# Patient Record
Sex: FEMALE | Race: WHITE
[De-identification: names, ages, dates, MRNs, and addresses within clinical notes are randomized per-mention and may not be internally consistent; named-entity substitution may affect disease eponyms.]

---

## 2017-01-23 ENCOUNTER — HOSPITAL ENCOUNTER (OUTPATIENT)
Dept: HOSPITAL 45 - C.LAB1850 | Age: 62
Discharge: HOME | End: 2017-01-23
Attending: INTERNAL MEDICINE
Payer: COMMERCIAL

## 2017-01-23 DIAGNOSIS — M81.0: Primary | ICD-10-CM

## 2017-01-23 DIAGNOSIS — E61.8: ICD-10-CM

## 2017-01-23 DIAGNOSIS — E55.9: ICD-10-CM

## 2017-01-23 LAB
CALCIUM SERPL-MCNC: 9.2 MG/DL (ref 8.5–10.1)
CALCIUM UR-MCNC: 13.6 MG/DL
COLLECT DURATION TIME UR: 24 HOURS
CREAT SERPL-MCNC: 1 MG/DL (ref 0.6–1.2)

## 2017-01-24 LAB
ALBUMIN SERPL-MCNC: 4.3 G/DL (ref 3.8–4.8)
BETA-2-GLOBULIN: 0.3 G/DL (ref 0.2–0.5)
ELECTROPHORESIS INTERPRET: (no result)
GAMMA GLOB SERPL ELPH-MCNC: 1.2 G/DL (ref 0.8–1.7)
PROT SERPL-MCNC: 7.2 G/DL (ref 6.2–8.3)

## 2017-04-21 ENCOUNTER — HOSPITAL ENCOUNTER (OUTPATIENT)
Dept: HOSPITAL 45 - C.LAB1850 | Age: 62
Discharge: HOME | End: 2017-04-21
Attending: INTERNAL MEDICINE
Payer: COMMERCIAL

## 2017-04-21 DIAGNOSIS — E61.8: ICD-10-CM

## 2017-04-21 DIAGNOSIS — E55.9: ICD-10-CM

## 2017-04-21 DIAGNOSIS — M81.0: Primary | ICD-10-CM

## 2018-02-22 ENCOUNTER — HOSPITAL ENCOUNTER (OUTPATIENT)
Dept: HOSPITAL 45 - C.LAB1850 | Age: 63
Discharge: HOME | End: 2018-02-22
Attending: INTERNAL MEDICINE
Payer: COMMERCIAL

## 2018-02-22 DIAGNOSIS — M81.0: ICD-10-CM

## 2018-02-22 DIAGNOSIS — E78.00: ICD-10-CM

## 2018-02-22 DIAGNOSIS — E55.9: ICD-10-CM

## 2018-02-22 DIAGNOSIS — E61.8: ICD-10-CM

## 2018-02-22 DIAGNOSIS — E03.9: ICD-10-CM

## 2018-02-22 DIAGNOSIS — I10: Primary | ICD-10-CM

## 2018-02-22 DIAGNOSIS — R73.01: ICD-10-CM

## 2018-02-22 LAB
ALBUMIN SERPL-MCNC: 3.8 GM/DL (ref 3.4–5)
ALP SERPL-CCNC: 74 U/L (ref 45–117)
ALT SERPL-CCNC: 31 U/L (ref 12–78)
AST SERPL-CCNC: 15 U/L (ref 15–37)
BASOPHILS # BLD: 0.03 K/UL (ref 0–0.2)
BASOPHILS NFR BLD: 0.5 %
BUN SERPL-MCNC: 22 MG/DL (ref 7–18)
CALCIUM SERPL-MCNC: 9.3 MG/DL (ref 8.5–10.1)
CO2 SERPL-SCNC: 27 MMOL/L (ref 21–32)
CREAT SERPL-MCNC: 1.07 MG/DL (ref 0.6–1.2)
EOS ABS #: 0.11 K/UL (ref 0–0.5)
EOSINOPHIL NFR BLD AUTO: 329 K/UL (ref 130–400)
GLUCOSE SERPL-MCNC: 86 MG/DL (ref 70–99)
HBA1C MFR BLD: 5.6 % (ref 4.5–5.6)
HCT VFR BLD CALC: 43.5 % (ref 37–47)
HGB BLD-MCNC: 14.9 G/DL (ref 12–16)
IG#: 0.01 K/UL (ref 0–0.02)
IMM GRANULOCYTES NFR BLD AUTO: 32.6 %
KETONES UR QL STRIP: 107 MG/DL
LYMPHOCYTES # BLD: 1.89 K/UL (ref 1.2–3.4)
MCH RBC QN AUTO: 30.6 PG (ref 25–34)
MCHC RBC AUTO-ENTMCNC: 34.3 G/DL (ref 32–36)
MCV RBC AUTO: 89.3 FL (ref 80–100)
MONO ABS #: 0.47 K/UL (ref 0.11–0.59)
MONOCYTES NFR BLD: 8.1 %
NEUT ABS #: 3.29 K/UL (ref 1.4–6.5)
NEUTROPHILS # BLD AUTO: 1.9 %
NEUTROPHILS NFR BLD AUTO: 56.7 %
PH UR: 185 MG/DL (ref 0–200)
PMV BLD AUTO: 9.4 FL (ref 7.4–10.4)
POTASSIUM SERPL-SCNC: 4.1 MMOL/L (ref 3.5–5.1)
PROT SERPL-MCNC: 7.8 GM/DL (ref 6.4–8.2)
RED CELL DISTRIBUTION WIDTH CV: 14.1 % (ref 11.5–14.5)
RED CELL DISTRIBUTION WIDTH SD: 46 FL (ref 36.4–46.3)
SODIUM SERPL-SCNC: 136 MMOL/L (ref 136–145)
WBC # BLD AUTO: 5.8 K/UL (ref 4.8–10.8)

## 2018-05-16 ENCOUNTER — HOSPITAL ENCOUNTER (OUTPATIENT)
Dept: HOSPITAL 45 - C.GI | Age: 63
Discharge: HOME | End: 2018-05-16
Attending: INTERNAL MEDICINE
Payer: COMMERCIAL

## 2018-05-16 VITALS
WEIGHT: 175.38 LBS | HEIGHT: 62.01 IN | BODY MASS INDEX: 31.87 KG/M2 | WEIGHT: 175.38 LBS | HEIGHT: 62.01 IN | BODY MASS INDEX: 31.87 KG/M2

## 2018-05-16 VITALS — OXYGEN SATURATION: 98 % | SYSTOLIC BLOOD PRESSURE: 111 MMHG | HEART RATE: 78 BPM | DIASTOLIC BLOOD PRESSURE: 66 MMHG

## 2018-05-16 DIAGNOSIS — Z91.041: ICD-10-CM

## 2018-05-16 DIAGNOSIS — Z12.11: Primary | ICD-10-CM

## 2018-05-16 DIAGNOSIS — K21.9: ICD-10-CM

## 2018-05-16 DIAGNOSIS — Z91.013: ICD-10-CM

## 2018-05-16 DIAGNOSIS — Z90.710: ICD-10-CM

## 2018-05-16 DIAGNOSIS — Z90.12: ICD-10-CM

## 2018-05-16 DIAGNOSIS — Z79.899: ICD-10-CM

## 2018-05-16 DIAGNOSIS — D12.8: ICD-10-CM

## 2018-05-16 DIAGNOSIS — K64.8: ICD-10-CM

## 2018-05-16 DIAGNOSIS — Z85.3: ICD-10-CM

## 2018-05-16 DIAGNOSIS — Z87.891: ICD-10-CM

## 2018-05-16 DIAGNOSIS — E78.5: ICD-10-CM

## 2018-05-16 DIAGNOSIS — I10: ICD-10-CM

## 2018-05-16 NOTE — ANESTHESIOLOGY PROGRESS NOTE
Anesthesia Post Op Note


Date & Time


May 16, 2018 at 11:36





Vital Signs





Vital Signs Past 12 Hours








  Date Time  Temp Pulse Resp B/P (MAP) Pulse Ox O2 Delivery O2 Flow Rate FiO2


 


5/16/18 11:29  74 18 91/65 (74) 96 Room Air  


 


5/16/18 11:14  76 16 97/61 (73) 96 Room Air  


 


5/16/18 10:28 36.4 72 20 139/97 (111) 97 Room Air  











Notes


Mental Status:  alert / awake / arousable, participated in evaluation


Pt Amnestic to Procedure:  Yes


Nausea / Vomiting:  adequately controlled


Pain:  adequately controlled


Airway Patency, RR, SpO2:  stable & adequate


BP & HR:  stable & adequate


Hydration State:  stable & adequate


Anesthetic Complications:  no major complications apparent

## 2018-05-16 NOTE — GI REPORT
Patient Name: Rosa Crowder

Procedure Date: 5/16/2018 10:30 AM

MRN: [id number]

Account Number: [id number]

YOB: 1955

Admit Type: Outpatient

Age: 62

Gender: Female

Attending MD: Stephen Thurman DO

Procedure:            Colonoscopy

Providers:            Stephen Thurman DO

Referring MD:         Richard Brock

Indications:          Screening for colorectal malignant neoplasm

Medicines:            Monitored Anesthesia Care

Complications:        No immediate complications.

Estimated Blood Loss: Estimated blood loss: none.

Procedure:            Pre-Anesthesia Assessment:

                      - Prior to the procedure, a History and Physical was 

                      performed, and patient medications and allergies were 

                      reviewed. The patient's tolerance of previous 

                      anesthesia was also reviewed. The risks and benefits of 

                      the procedure and the sedation options and risks were 

                      discussed with the patient. All questions were 

                      answered, and informed consent was obtained. Prior 

                      Anticoagulants: The patient has taken aspirin, last 

                      dose was 4 days prior to procedure. ASA Grade 

                      Assessment: II - A patient with mild systemic disease. 

                      After reviewing the risks and benefits, the patient was 

                      deemed in satisfactory condition to undergo the 

                      procedure.

                      After I obtained informed consent, the scope was passed 

                      under direct vision. Throughout the procedure, the 

                      patient's blood pressure, pulse, and oxygen saturations 

                      were monitored continuously. The scope was introduced 

                      through the anus and advanced to the terminal ileum. 

                      The colonoscopy was performed without difficulty. The 

                      patient tolerated the procedure well. The quality of 

                      the bowel preparation was good. The terminal ileum, 

                      ileocecal valve, appendiceal orifice, and rectum were 

                      photographed.

Findings:

     The perianal and digital rectal examinations were normal.

     A 6 mm polyp was found in the rectum. The polyp was sessile. The polyp 

     was removed with a hot snare. Resection and retrieval were complete.

     Non-bleeding internal hemorrhoids were found during retroflexion. The 

     hemorrhoids were small.

Impression:           - One 6 mm polyp in the rectum, removed with a hot 

                      snare. Resected and retrieved.

                      - Non-bleeding internal hemorrhoids.

Recommendation:       - Resume previous diet.

                      - Continue present medications.

                      - Repeat colonoscopy for surveillance based on 

                      pathology results.

                      - Return to primary care physician as previously 

                      scheduled.

Stephen Thurman DO

5/16/2018 11:49:06 AM

This report has been signed electronically.

Note Initiated On: 5/16/2018 10:30 AM

Number of Addenda: 0

     I attest to the content of the Intraoperative Record and orders 

     documented therein, exceptions below



{[id number]}

## 2018-05-16 NOTE — ENDO HISTORY AND PHYSICAL
History & Physical


Date of Service:


May 16, 2018.


Chief Complaint:


Screening


Referring Physician:


Dr Brock


History of Present Illness


61 yo CF who presents for screening colonoscopy.





Past Surgical History


Hx Cardiac Surgery:  No


Hx Internal Defibrillator:  No


Hx Pacemaker:  No


Hx Abdominal Surgery:  Yes (HYSTERECTOMY)


Hx of Implantable Prosthesis:  No


Hx Cancer Surgery:  Yes (L BREAST MASECTOMY)


Hx Thoracic Surgery:  No


Hx Orthopedic:  No


Hx Urinary Tract Surgery:  No





Family History


IBD





Social History


Smoking Status:  Former Smoker


Hx Substance Use:  Yes (HX OF MARIJUANA USE IN PAST, NONE RECENTLY)


Hx Alcohol Use:  Yes (OCCASIONAL)





Allergies


Coded Allergies:  


     Dust (Verified  Allergy, Unknown, SNEEZING, 5/16/18)


     Gadolinium (Verified  Allergy, Unknown, ANAPHYLAXIS, 5/16/18)


     Iodinated Diagnostic Agents (Verified  Allergy, Unknown, ANAPHYLAXIS, 5/16/ 18)


     POLLEN (Verified  Allergy, Unknown, SNEEZING, 5/16/18)


     Shellfish (Verified  Allergy, Unknown, SWELLING, 5/16/18)





Current Medications





Reported Home Medications








 Medications  Dose


 Route/Sig


 Max Daily Dose Days Date Category


 


 Vitamin D


  (Cholecalciferol)


 5,000 Unit Tab  1 Tab


 PO DAILY


    5/2/18 Reported


 


 Prinivil


  (Lisinopril) 10


 Mg Tab  10 Mg


 PO DAILY


    5/2/18 Reported


 


 Simvastatin 20 Mg


 Tab  1 Tab


 PO DAILY


    5/2/18 Reported


 


 Tylenol


  (Acetaminophen)


 500 Mg Tab  2 Tab


 PO Q6 PRN


   2 5/2/18 Reported


 


 Aspirin 325 Mg Tab  325 Mg


 PO AS DIRECTED PRN


    5/2/18 Reported


 


 Colace (Docusate


 Sodium) 100 Mg Cap  2 Cap


 PO DAILY PRN


   15 5/2/18 Reported


 


 Multivitamin


  (Multiple


 Vitamin) 1 Tab Tab  1 Tab


 PO DAILY


    2/21/13 Reported


 


 Caltrate 600 Plus


  (Calcium


 Carbonate-Vitamin


 D W/) 1 Tab Tab  1 Tab


 PO DAILY


    2/21/13 Reported











Vital Signs


Weight (Kilograms):  79.55


Height (Feet):  5


Height (Inches):  2











  Date Time  Temp Pulse Resp B/P (MAP) Pulse Ox O2 Delivery O2 Flow Rate FiO2


 


5/16/18 10:28 36.4 72 20 139/97 (111) 97 Room Air  











Physical Exam


General Appearance:  WD/WN, no apparent distress


Respiratory/Chest:  


   Auscultation:  breath sounds normal


Cardiovascular:  


   Heart Auscultation:  RRR


Abdomen:  


   Bowel Sounds:  normal


   Inspection & Palpation:  soft, non-distended, no tenderness, guarding & 

rebound





Assessment and Plan


Assessment:


61 yo CF who presents for screening colonoscopy.








Plan:


Proceed with colonoscopy.

## 2018-05-16 NOTE — DISCHARGE INSTRUCTIONS
Endoscopy Patient Instructions


Date / Procedure(s) Performed


May 16, 2018.


Colonoscopy





Allergy Information


Coded Allergies:  


     Dust (Verified  Allergy, Unknown, SNEEZING, 5/16/18)


     Gadolinium (Verified  Allergy, Unknown, ANAPHYLAXIS, 5/16/18)


     Iodinated Diagnostic Agents (Verified  Allergy, Unknown, ANAPHYLAXIS, 5/16/ 18)


     POLLEN (Verified  Allergy, Unknown, SNEEZING, 5/16/18)


     Shellfish (Verified  Allergy, Unknown, SWELLING, 5/16/18)





Discharge Date / Findings


May 16, 2018.


Rectal polyp


Internal hemorrhoids





Medication Instructions


OK to resume all medications today as prescribed





Reported Home Medications








 Medications  Dose


 Route/Sig


 Max Daily Dose Days Date Category


 


 Vitamin D


  (Cholecalciferol)


 5,000 Unit Tab  1 Tab


 PO DAILY


    5/2/18 Reported


 


 Prinivil


  (Lisinopril) 10


 Mg Tab  10 Mg


 PO DAILY


    5/2/18 Reported


 


 Simvastatin 20 Mg


 Tab  1 Tab


 PO DAILY


    5/2/18 Reported


 


 Tylenol


  (Acetaminophen)


 500 Mg Tab  2 Tab


 PO Q6 PRN


   2 5/2/18 Reported


 


 Aspirin 325 Mg Tab  325 Mg


 PO AS DIRECTED PRN


    5/2/18 Reported


 


 Colace (Docusate


 Sodium) 100 Mg Cap  2 Cap


 PO DAILY PRN


   15 5/2/18 Reported


 


 Multivitamin


  (Multiple


 Vitamin) 1 Tab Tab  1 Tab


 PO DAILY


    2/21/13 Reported


 


 Caltrate 600 Plus


  (Calcium


 Carbonate-Vitamin


 D W/) 1 Tab Tab  1 Tab


 PO DAILY


    2/21/13 Reported











Provider Instructions





Activity Restrictions





-  No exercising or heavy lifting for 24 hours. 


-  Do not drink alcohol the day of the procedure.


-  Do not drive a car or operate machinery until the day after the procedure.


-  Do not make any important decisions or sign important papers in 24 hours 

after the procedure.





Following Day:





-  Return to full activity which may include returning to work/school.





Diet





Start your diet with liquids and light foods (jello, soup, juice, toast).  Then 

eat your usual diet if not nauseated.





Treatment For Common After Affects





For mild abdominal pain, bloating, or excessive gas:





-  Rest


-  Eat lightly


-  Lie on right side





Follow-Up Information


Follow-up with Dr Brock as scheduled





Anesthesia Information





What You Should Know





You have had a procedure that required some medicine to reduce anxiety and 

discomfort. This treatment is called moderate sedation.  


After receiving the treatment, you may be sleepy, but you will be able to 

breathe on your own.  The effects of the treatment may last for several hours.








Follow these instructions along with Activity/Diet recommendations noted above:





*  Do NOT do anything where dizziness or clumsiness would be dangerous.





*  Rest quietly at home today, then you can be up and about tomorrow.





*  Have a responsible person stay with you the rest of today.





*  You may have had an I.V. today.  If so, you may take the dressing off later 

today.





Recommendations


 


Call your doctor if:





*  Trouble breathing 





*  Continuous vomiting for more than 24 hours








*  Temperature above 101 degrees





*  Severe abdominal pain or bloating





*  Pain not relieved by pain medicine ordered





*  There is increased drainage or redness from any incision





*  A large amount of rectal bleeding greater than 2-3 tablespoons. 


   (If you had a polyp/s removed or have hemorrhoids, a small amount of blood -


    from the rectum is to be expected.)





*  You have any unanswered questions or concerns.








IN THE EVENT OF A SERIOUS EMERGENCY, GO TO THE NEAREST EMERGENCY ROOM








       Your discharge instructions were prepared by provider Stephen Thurman.





 Patient Instructions Signature Page














Rosa Crowder 











Patient (or Guardian) Signature/Date:____________________________________ I 

have read and understand the instructions given to me by my caregivers.








Caregiver/RN/Doctor Signature/Date:____________________________________











The above-named patient and/or guardian has received patient instructions on 

this date.





























+  Original Patient Signature Page (only) stays with chart.  Please make copy 

for patient.